# Patient Record
Sex: MALE | NOT HISPANIC OR LATINO
[De-identification: names, ages, dates, MRNs, and addresses within clinical notes are randomized per-mention and may not be internally consistent; named-entity substitution may affect disease eponyms.]

---

## 2024-01-17 ENCOUNTER — APPOINTMENT (OUTPATIENT)
Dept: UROLOGY | Facility: CLINIC | Age: 59
End: 2024-01-17
Payer: COMMERCIAL

## 2024-01-17 VITALS
SYSTOLIC BLOOD PRESSURE: 157 MMHG | BODY MASS INDEX: 32.24 KG/M2 | HEART RATE: 77 BPM | DIASTOLIC BLOOD PRESSURE: 84 MMHG | HEIGHT: 65 IN | OXYGEN SATURATION: 97 % | WEIGHT: 193.5 LBS | TEMPERATURE: 98.5 F | RESPIRATION RATE: 16 BRPM

## 2024-01-17 DIAGNOSIS — Z12.5 ENCOUNTER FOR SCREENING FOR MALIGNANT NEOPLASM OF PROSTATE: ICD-10-CM

## 2024-01-17 DIAGNOSIS — R39.15 URGENCY OF URINATION: ICD-10-CM

## 2024-01-17 DIAGNOSIS — Z84.1 FAMILY HISTORY OF DISORDERS OF KIDNEY AND URETER: ICD-10-CM

## 2024-01-17 DIAGNOSIS — Z78.9 OTHER SPECIFIED HEALTH STATUS: ICD-10-CM

## 2024-01-17 PROBLEM — Z00.00 ENCOUNTER FOR PREVENTIVE HEALTH EXAMINATION: Status: ACTIVE | Noted: 2024-01-17

## 2024-01-17 PROCEDURE — 99204 OFFICE O/P NEW MOD 45 MIN: CPT

## 2024-01-17 NOTE — ASSESSMENT
[FreeTextEntry1] : 58 y.o. M who presents with:  #LUTS / BPH - PVR 0 ml - MARÍA with mildly enlarged prostate - ~40g+ - Uroflow w/ staccato pattern, QMax 10.2 mL / sec - PSA / BMP - Bladder / renal US to assess prostate size - Discussed tamsulosin. Discussed risk and benefits. Will wait until after the ultrasound is completed  #Scrotal pustule - draining. No systemic systems.  Recommended keeping area clean, avoidance of shaving or other high risk activities

## 2024-01-17 NOTE — HISTORY OF PRESENT ILLNESS
[FreeTextEntry1] : ABEBE TILLEY is a 58 year M who presents today as a new patient evaluation for LUTS.   #LUTS / dysuria Some pain at end of urination at head of penis. Intermittent. Also with urgency with incontinence. Nocturia x1-2. Stream is strong and straight.  AUA symptom score: 5-3-1-5-0-0-5 -16.  QOL 3 (mixed) No gross hematuria. No UTI 2 cups of coffee in AM. Once / week - glass of wine. No soda / tea BMs are regular  #Scrotal concerns Starting friday -noticed some whitish material draining from inside of his scrotum.  He recently shaved at this area. No fevers / chills Has this before 5 years ago  Anticoagulation: None All: Dust Social: EtOH, wine.  Non-smoker.  Works a  @ [employer] PMHx: Seasonal allergies  PSHx: Gallbladder FHx: No family history of  malignancy   Denies gross hematuria, flank pain, fevers, chills, nausea, vomiting.

## 2024-01-17 NOTE — LETTER BODY
[Dear  ___] : Dear  [unfilled], [Consult Letter:] : I had the pleasure of evaluating your patient, [unfilled]. [Please see my note below.] : Please see my note below. [Consult Closing:] : Thank you very much for allowing me to participate in the care of this patient.  If you have any questions, please do not hesitate to contact me. [Sincerely,] : Sincerely, [FreeTextEntry2] : Deven Mendez -[address], [place] [address] [FreeTextEntry3] : Luis Yao MD [facility] at [address], [address] [place] [address] p: [phone] f: [phone]

## 2024-01-17 NOTE — REVIEW OF SYSTEMS
[Seen by urologist before (Name)  ___] : Preciously seen by a urologist: [unfilled] [Discharge from urine canal] : discharge from urine canal [Wake up at night to urinate  How many times?  ___] : wakes up to urinate [unfilled] times during the night [Strong urge to urinate] : strong urge to urinate [Leakage of urine with urgency] : leakage of urine with urgency [Negative] : Heme/Lymph

## 2024-01-17 NOTE — PHYSICAL EXAM
[Normal Appearance] : normal appearance [Edema] : no peripheral edema [Exaggerated Use Of Accessory Muscles For Inspiration] : no accessory muscle use [Bowel Sounds] : normal bowel sounds [Abdomen Tenderness] : non-tender [Urethral Meatus] : meatus normal [Epididymis] : the epididymides were normal [Testes Tenderness] : no tenderness of the testes [Testes Mass (___cm)] : there were no testicular masses [] : no rash [No Focal Deficits] : no focal deficits [Oriented To Time, Place, And Person] : oriented to person, place, and time [de-identified] : 5 mm pustule on right side of scrotum.  Central mullen - able to express purulence.  No induration, warmth, connection to underlying scrotal structures

## 2024-01-18 LAB
ANION GAP SERPL CALC-SCNC: 11 MMOL/L
APPEARANCE: CLEAR
BACTERIA: NEGATIVE /HPF
BILIRUBIN URINE: NEGATIVE
BLOOD URINE: NEGATIVE
BUN SERPL-MCNC: 7 MG/DL
CALCIUM SERPL-MCNC: 9.5 MG/DL
CAST: 1 /LPF
CHLORIDE SERPL-SCNC: 101 MMOL/L
CO2 SERPL-SCNC: 24 MMOL/L
COLOR: NORMAL
CREAT SERPL-MCNC: 0.87 MG/DL
EGFR: 99 ML/MIN/1.73M2
EPITHELIAL CELLS: 1 /HPF
GLUCOSE QUALITATIVE U: NEGATIVE MG/DL
GLUCOSE SERPL-MCNC: 95 MG/DL
KETONES URINE: NEGATIVE MG/DL
LEUKOCYTE ESTERASE URINE: ABNORMAL
MICROSCOPIC-UA: NORMAL
NITRITE URINE: NEGATIVE
PH URINE: 8
POTASSIUM SERPL-SCNC: 4.8 MMOL/L
PROTEIN URINE: 30 MG/DL
PSA SERPL-MCNC: 0.81 NG/ML
RED BLOOD CELLS URINE: 2 /HPF
SODIUM SERPL-SCNC: 136 MMOL/L
SPECIFIC GRAVITY URINE: 1.02
UROBILINOGEN URINE: 0.2 MG/DL
WHITE BLOOD CELLS URINE: 1 /HPF

## 2024-01-19 LAB — BACTERIA UR CULT: NORMAL

## 2024-02-07 ENCOUNTER — APPOINTMENT (OUTPATIENT)
Dept: ULTRASOUND IMAGING | Facility: IMAGING CENTER | Age: 59
End: 2024-02-07
Payer: COMMERCIAL

## 2024-02-07 ENCOUNTER — OUTPATIENT (OUTPATIENT)
Dept: OUTPATIENT SERVICES | Facility: HOSPITAL | Age: 59
LOS: 1 days | End: 2024-02-07
Payer: COMMERCIAL

## 2024-02-07 DIAGNOSIS — R39.15 URGENCY OF URINATION: ICD-10-CM

## 2024-02-07 PROCEDURE — 76770 US EXAM ABDO BACK WALL COMP: CPT | Mod: 26

## 2024-02-07 PROCEDURE — 76770 US EXAM ABDO BACK WALL COMP: CPT

## 2024-07-17 ENCOUNTER — APPOINTMENT (OUTPATIENT)
Dept: UROLOGY | Facility: CLINIC | Age: 59
End: 2024-07-17